# Patient Record
Sex: FEMALE | URBAN - METROPOLITAN AREA
[De-identification: names, ages, dates, MRNs, and addresses within clinical notes are randomized per-mention and may not be internally consistent; named-entity substitution may affect disease eponyms.]

---

## 2022-07-08 ENCOUNTER — APPOINTMENT (RX ONLY)
Dept: URBAN - METROPOLITAN AREA CLINIC 55 | Facility: CLINIC | Age: 37
Setting detail: DERMATOLOGY
End: 2022-07-08

## 2022-07-08 DIAGNOSIS — Z41.9 ENCOUNTER FOR PROCEDURE FOR PURPOSES OTHER THAN REMEDYING HEALTH STATE, UNSPECIFIED: ICD-10-CM

## 2022-07-08 PROCEDURE — ? COSMETIC CONSULTATION: GENERAL

## 2022-07-12 ENCOUNTER — RX ONLY (OUTPATIENT)
Age: 37
Setting detail: RX ONLY
End: 2022-07-12

## 2022-10-31 ENCOUNTER — RX ONLY (OUTPATIENT)
Age: 37
Setting detail: RX ONLY
End: 2022-10-31

## 2022-10-31 RX ORDER — VALACYCLOVIR 500 MG/1
TABLET, FILM COATED ORAL
Qty: 10 | Refills: 0 | Status: ERX | COMMUNITY
Start: 2022-10-31

## 2022-11-01 ENCOUNTER — RX ONLY (OUTPATIENT)
Age: 37
Setting detail: RX ONLY
End: 2022-11-01

## 2022-11-01 RX ORDER — DOXYCYCLINE HYCLATE 100 MG/1
CAPSULE, GELATIN COATED ORAL
Qty: 60 | Refills: 3 | Status: ERX | COMMUNITY
Start: 2022-11-01

## 2022-11-23 ENCOUNTER — APPOINTMENT (RX ONLY)
Dept: URBAN - METROPOLITAN AREA CLINIC 55 | Facility: CLINIC | Age: 37
Setting detail: DERMATOLOGY
End: 2022-11-23

## 2022-11-23 DIAGNOSIS — Z41.9 ENCOUNTER FOR PROCEDURE FOR PURPOSES OTHER THAN REMEDYING HEALTH STATE, UNSPECIFIED: ICD-10-CM

## 2022-11-23 PROCEDURE — ? INVENTORY

## 2022-11-23 PROCEDURE — ? IN-HOUSE DISPENSING PHARMACY

## 2022-11-23 NOTE — PROCEDURE: IN-HOUSE DISPENSING PHARMACY
Product 71 Amount/Unit (Numbers Only): 0
Product 68 Unit Type: mg
Name Of Product 5: Hydrating Tretinoin 0.025% Cream
Name Of Product 2: Dapsone / Spironolactone Gel
Product 4 Application Directions: Apply nightly or as directed.
Product 7 Amount/Unit (Numbers Only): 30
Product 6 Refills: 11
Name Of Product 6: Rosacea Triple Combination Gel
Product 3 Application Directions: Apply nightly or as directed. Use SPF daily.
Send Charges To Patient Encounter: No
Name Of Product 3: Acne Triple Combination Gel
Product 1 Unit Type: ml
Detail Level: Zone
Render Product Pricing In Note: Yes
Product 7 Units Dispensed: 1
Product 1 Refills: 2
Name Of Product 7: Lidocaine 23% / Tetracaine 7% Cream
Name Of Product 4: Hydroquinone 8% Emulsion
Product 7 Application Directions: Apply only as directed
Name Of Product 1: Anti-Aging Brightening Cream

## 2022-12-05 ENCOUNTER — APPOINTMENT (RX ONLY)
Dept: URBAN - METROPOLITAN AREA CLINIC 55 | Facility: CLINIC | Age: 37
Setting detail: DERMATOLOGY
End: 2022-12-05

## 2022-12-05 DIAGNOSIS — Z41.9 ENCOUNTER FOR PROCEDURE FOR PURPOSES OTHER THAN REMEDYING HEALTH STATE, UNSPECIFIED: ICD-10-CM

## 2022-12-05 PROCEDURE — ? FRAXEL

## 2022-12-05 PROCEDURE — ? INVENTORY

## 2022-12-05 NOTE — PROCEDURE: FRAXEL
Location: cheeks
Medium Metal Eye Shield Text: The ocular mucosa was anesthetized with tetracaine. Once adequate anesthesia was optained, medium metal eye shields were inserted and remained in place until the procedure was completed.
Topical Anesthesia Type: 23% lidocaine, 7% tetracaine
Number Of Passes: 1
Number Of Passes: 8
Post-Care Instructions: Ice packs were given to the patient. Reviewed with the patient in detail post-care instructions.\\nRecommended Co2 mask application once a day x3 days and Alastin post procedure kit for post care products.
Length Of Topical Anesthesia Application (Optional): 60 minutes
Treatment Level: 6
Depth In Microns (Use Numbers Only, No Special Characters Or $): 423
Energy(Mj/Cm2): 30
External Cooling Fan Speed: 5
Total Coverage: 11%
Large Metal Eye Shield Text: The ocular mucosa was anesthetized with tetracaine. Once adequate anesthesia was optained, large metal eye shields were inserted and remained in place until the procedure was completed.
Detail Level: Zone
Was An Eye Shield Used?: No
Small Plastic Eye Shield Text: The ocular mucosa was anesthetized with tetracaine. Once adequate anesthesia was optained, small plastic eye shields were inserted and remained in place until the procedure was completed.
Location: full face
Indication: resurfacing
Total Coverage: 14%
Wavelength: 1550nm
Total Energy In Kj (Optional- Don't Include Units): 1.99
Depth In Microns (Use Numbers Only, No Special Characters Or $): 980
Anesthesia Type: 1% lidocaine with epinephrine
Medium Plastic Eye Shield Text: The ocular mucosa was anesthetized with tetracaine. Once adequate anesthesia was optained, medium plastic eye shields were inserted and remained in place until the procedure was completed.
Energy(Mj/Cm2): 15
Location Override: except cheeks
Pre-Procedure Text (Will Appear After Anesthesia Text): Patient reports taking valtrex per protocol and applied HQ cream as directed.\\nThe skin was cleansed and the treatment area was prepped with 70% isopropyl alcohol. \\nLaser goggles placed over the eyes for eye protection.
Total Energy In Kj (Optional- Don't Include Units): 1.98
consent obtained, risks reviewed.
Small Metal Eye Shield Text: The ocular mucosa was anesthetized with tetracaine. Once adequate anesthesia was optained, small metal eye shields were inserted and remained in place until the procedure was completed.
Treatment Level: 4
Large Plastic Eye Shield Text: The ocular mucosa was anesthetized with tetracaine. Once adequate anesthesia was optained, large plastic eye shields were inserted and remained in place until the procedure was completed.

## 2022-12-14 ENCOUNTER — RX ONLY (OUTPATIENT)
Age: 37
Setting detail: RX ONLY
End: 2022-12-14

## 2022-12-14 RX ORDER — FLUCONAZOLE 150 MG/1
TABLET ORAL
Qty: 1 | Refills: 1 | Status: ERX | COMMUNITY
Start: 2022-12-14

## 2023-07-27 ENCOUNTER — APPOINTMENT (RX ONLY)
Dept: URBAN - METROPOLITAN AREA CLINIC 55 | Facility: CLINIC | Age: 38
Setting detail: DERMATOLOGY
End: 2023-07-27

## 2023-07-27 DIAGNOSIS — Z41.9 ENCOUNTER FOR PROCEDURE FOR PURPOSES OTHER THAN REMEDYING HEALTH STATE, UNSPECIFIED: ICD-10-CM

## 2023-07-27 PROCEDURE — ? DERMAPLANE

## 2023-07-27 PROCEDURE — ? INVENTORY

## 2023-07-27 PROCEDURE — ? SIGNATURE HYDRAFACIAL

## 2023-07-27 ASSESSMENT — LOCATION DETAILED DESCRIPTION DERM: LOCATION DETAILED: GLABELLA

## 2023-07-27 ASSESSMENT — LOCATION SIMPLE DESCRIPTION DERM: LOCATION SIMPLE: GLABELLA

## 2023-07-27 ASSESSMENT — LOCATION ZONE DERM: LOCATION ZONE: FACE

## 2023-07-27 NOTE — PROCEDURE: DERMAPLANE
Treatment Areas: face and neck
Blade: 10R blade scalpel
Pre-Procedure Text: The patient was placed in a recumbant position on the procedure table.
Detail Level: Zone
Treatment Area Prep: acetone
Post-Care Instructions: I reviewed with the patient in detail post-care instructions.
Post-Procedure Instructions: Following the dermaplane procedure, phyto corrective mask was applied to the treatment areas. Washed off with cool washcloth. Moisturizer and SPF was applied. Gave patient fraxel brochure and step by step skincare routine.

## 2023-07-27 NOTE — PROCEDURE: SIGNATURE HYDRAFACIAL
Vacuum Pressure Low Setting (Will Not Render If Set To 0): 0
Tip Override
Tip: Hydropeel Tip, Clear
Procedure: Extend and Protect
Solution Override
Solution: Activ-4
Location: face
Procedure: Peel
Procedure: Boost
Solution: Beta-HD
Consent: Written consent obtained, risks reviewed including but not limited to crusting, scabbing, blistering, scarring, darker or lighter pigmentary change, bruising, and/or incomplete response.
Tip: Hydropeel Tip, Teal
Solution: GlySal 7.5%
Post-Care Instructions: I reviewed with the patient in detail post-care instructions. Patient should stay away from the sun and wear sun protection until treated areas are fully healed.
Treatment Number: 1
Indication: prominent pores
Tip: Hydropeel Tip, Blue
Procedure: Fusion
Procedure: Exfoliation
Procedure: Extraction

## 2023-11-15 ENCOUNTER — RX ONLY (OUTPATIENT)
Age: 38
Setting detail: RX ONLY
End: 2023-11-15

## 2023-11-15 ENCOUNTER — APPOINTMENT (RX ONLY)
Dept: URBAN - METROPOLITAN AREA CLINIC 55 | Facility: CLINIC | Age: 38
Setting detail: DERMATOLOGY
End: 2023-11-15

## 2023-11-15 DIAGNOSIS — Z41.9 ENCOUNTER FOR PROCEDURE FOR PURPOSES OTHER THAN REMEDYING HEALTH STATE, UNSPECIFIED: ICD-10-CM

## 2023-11-15 PROCEDURE — ? INVENTORY

## 2023-11-15 PROCEDURE — ? IN-HOUSE DISPENSING PHARMACY

## 2023-11-15 RX ORDER — VALACYCLOVIR 500 MG/1
TABLET, FILM COATED ORAL
Qty: 10 | Refills: 0 | Status: ERX

## 2023-11-15 NOTE — PROCEDURE: IN-HOUSE DISPENSING PHARMACY
Product 46 Refills: 0
Product 16 Unit Type: mg
Detail Level: Zone
Product 4 Amount/Unit (Numbers Only): 30
Product 3 Unit Type: ml
Product 6 Refills: 11
Send Charges To Patient Encounter: No
Product 1 Application Directions: Apply nightly or as directed. Use SPF daily.
Product 7 Units Dispensed: 1
Product 4 Refills: 2
Name Of Product 4: Hydroquinone 8% Emulsion
Product 2 Application Directions: Apply nightly or as directed.
Name Of Product 2: Dapsone / Spironolactone Gel
Name Of Product 1: Anti-Aging Brightening Cream
Name Of Product 7: Lidocaine 23% / Tetracaine 7% Cream
Render Product Pricing In Note: Yes
Product 7 Application Directions: Apply only as directed
Name Of Product 5: Hydrating Tretinoin 0.025% Cream
Name Of Product 3: Acne Triple Combination Gel
Name Of Product 6: Rosacea Triple Combination Gel

## 2023-12-18 ENCOUNTER — RX ONLY (OUTPATIENT)
Age: 38
Setting detail: RX ONLY
End: 2023-12-18

## 2023-12-18 ENCOUNTER — APPOINTMENT (RX ONLY)
Dept: URBAN - METROPOLITAN AREA CLINIC 55 | Facility: CLINIC | Age: 38
Setting detail: DERMATOLOGY
End: 2023-12-18

## 2023-12-18 DIAGNOSIS — Z41.9 ENCOUNTER FOR PROCEDURE FOR PURPOSES OTHER THAN REMEDYING HEALTH STATE, UNSPECIFIED: ICD-10-CM

## 2023-12-18 PROCEDURE — ? INVENTORY

## 2023-12-18 PROCEDURE — ? FRAXEL

## 2023-12-18 RX ORDER — DOXYCYCLINE HYCLATE 100 MG/1
CAPSULE, GELATIN COATED ORAL
Qty: 60 | Refills: 1 | Status: ERX

## 2023-12-18 NOTE — PROCEDURE: FRAXEL
Total Coverage: 14%
Treatment Level: 1
Was An Eye Shield Used?: No
Add Post-Care Below To The Note: Yes
Medium Plastic Eye Shield Text: The ocular mucosa was anesthetized with tetracaine. Once adequate anesthesia was optained, medium plastic eye shields were inserted and remained in place until the procedure was completed.
Energy(Mj/Cm2): 35
Detail Level: Zone
Treatment Level: 7
Post-Care Instructions: Ice packs were given to the patient to cool the skin prior to Co2 lift mask application. \\nReviewed with the patient in detail post-care instructions verbally and written. \\nCo2 lift mask was applied post procedure. \\nRecommended Alastin post procedure kit for post care products.
Small Metal Eye Shield Text: The ocular mucosa was anesthetized with tetracaine. Once adequate anesthesia was optained, small metal eye shields were inserted and remained in place until the procedure was completed.
Wavelength: 1550nm
Large Plastic Eye Shield Text: The ocular mucosa was anesthetized with tetracaine. Once adequate anesthesia was optained, large plastic eye shields were inserted and remained in place until the procedure was completed.
Number Of Passes: 8
Indication: resurfacing
Depth In Microns (Use Numbers Only, No Special Characters Or $): 3844
Tip: 15mm
Medium Metal Eye Shield Text: The ocular mucosa was anesthetized with tetracaine. Once adequate anesthesia was optained, medium metal eye shields were inserted and remained in place until the procedure was completed.
Energy(Mj/Cm2): 15
Total Energy In Kj (Optional- Don't Include Units): 4.70
Large Metal Eye Shield Text: The ocular mucosa was anesthetized with tetracaine. Once adequate anesthesia was optained, large metal eye shields were inserted and remained in place until the procedure was completed.
Treatment Level: 5
External Cooling: Lucian Cryo 5
Location: Use Location Override
Topical Anesthesia Type: 23% lidocaine, 7% tetracaine
Location: cheeks
Pre-Procedure Text (Will Appear After Anesthesia Text): Patient reports application of hydroquinone 8% cream as directed.\\nThe skin was cleansed with hibiclens and prepped with 70% isopropyl alcohol. \\nLaser goggles placed over the eyes for eye protection.
Depth In Microns (Use Numbers Only, No Special Characters Or $): 080
Length Of Topical Anesthesia Application (Optional): 60 minutes
Small Plastic Eye Shield Text: The ocular mucosa was anesthetized with tetracaine. Once adequate anesthesia was optained, small plastic eye shields were inserted and remained in place until the procedure was completed.
consent obtained, risks reviewed.
Location Override: forehead, perioral and nose
Total Coverage: 20%

## 2024-09-24 ENCOUNTER — RX ONLY (OUTPATIENT)
Age: 39
Setting detail: RX ONLY
End: 2024-09-24

## 2024-09-24 ENCOUNTER — APPOINTMENT (RX ONLY)
Dept: URBAN - METROPOLITAN AREA CLINIC 55 | Facility: CLINIC | Age: 39
Setting detail: DERMATOLOGY
End: 2024-09-24

## 2024-09-24 DIAGNOSIS — Z41.9 ENCOUNTER FOR PROCEDURE FOR PURPOSES OTHER THAN REMEDYING HEALTH STATE, UNSPECIFIED: ICD-10-CM

## 2024-09-24 PROCEDURE — ? IN-HOUSE DISPENSING PHARMACY

## 2024-09-24 PROCEDURE — ? COSMETIC CONSULTATION: GENERAL

## 2024-09-24 PROCEDURE — ? INVENTORY

## 2024-09-24 PROCEDURE — ? COSMETIC CONSULTATION: LASER RESURFACING

## 2024-09-24 RX ORDER — DOXYCYCLINE HYCLATE 100 MG/1
CAPSULE, GELATIN COATED ORAL
Qty: 60 | Refills: 3 | Status: ERX | COMMUNITY
Start: 2024-09-24

## 2024-09-24 RX ORDER — VALACYCLOVIR 500 MG/1
TABLET, FILM COATED ORAL
Qty: 10 | Refills: 0 | Status: ERX | COMMUNITY
Start: 2024-09-24

## 2024-09-24 NOTE — PROCEDURE: IN-HOUSE DISPENSING PHARMACY
Product 70 Price/Unit (In Dollars): 0
Product 34 Unit Type: mg
Product 2 Refills: 11
Product 1 Refills: 2
Product 4 Amount/Unit (Numbers Only): 30
Product 5 Application Directions: Apply nightly or as directed. Use SPF daily.
Product 2 Unit Type: ml
Render Product Pricing In Note: Yes
Name Of Product 7: Lidocaine 23% / Tetracaine 7% Cream
Name Of Product 4: Hydroquinone 8% Emulsion
Send Charges To Patient Encounter: No
Detail Level: Zone
Name Of Product 2: Dapsone / Spironolactone Gel
Name Of Product 1: Anti-Aging Brightening Cream
Product 7 Application Directions: Apply only as directed
Name Of Product 3: Acne Triple Combination Gel
Name Of Product 5: Hydrating Tretinoin 0.025% Cream
Product 4 Application Directions: Apply nightly or as directed.
Product 4 Units Dispensed: 1
Name Of Product 6: Rosacea Triple Combination Gel

## 2024-12-02 ENCOUNTER — APPOINTMENT (RX ONLY)
Dept: URBAN - METROPOLITAN AREA CLINIC 55 | Facility: CLINIC | Age: 39
Setting detail: DERMATOLOGY
End: 2024-12-02

## 2024-12-02 ENCOUNTER — RX ONLY (OUTPATIENT)
Age: 39
Setting detail: RX ONLY
End: 2024-12-02

## 2024-12-02 DIAGNOSIS — Z41.9 ENCOUNTER FOR PROCEDURE FOR PURPOSES OTHER THAN REMEDYING HEALTH STATE, UNSPECIFIED: ICD-10-CM

## 2024-12-02 PROCEDURE — ? INVENTORY

## 2024-12-02 RX ORDER — HYDROQUINONE 40 MG/G
CREAM TOPICAL
Qty: 28.35 | Refills: 1 | Status: ERX | COMMUNITY
Start: 2024-12-02

## 2025-05-19 ENCOUNTER — APPOINTMENT (OUTPATIENT)
Dept: URBAN - METROPOLITAN AREA CLINIC 55 | Facility: CLINIC | Age: 40
Setting detail: DERMATOLOGY
End: 2025-05-19

## 2025-05-19 DIAGNOSIS — Z41.9 ENCOUNTER FOR PROCEDURE FOR PURPOSES OTHER THAN REMEDYING HEALTH STATE, UNSPECIFIED: ICD-10-CM

## 2025-05-19 PROCEDURE — ? COSMETIC CONSULTATION: MICRONEEDLING

## 2025-05-19 PROCEDURE — ? INVENTORY

## 2025-08-08 ENCOUNTER — RX ONLY (RX ONLY)
Age: 40
End: 2025-08-08

## 2025-08-08 ENCOUNTER — APPOINTMENT (OUTPATIENT)
Dept: URBAN - METROPOLITAN AREA CLINIC 55 | Facility: CLINIC | Age: 40
Setting detail: DERMATOLOGY
End: 2025-08-08

## 2025-08-18 ENCOUNTER — APPOINTMENT (OUTPATIENT)
Dept: URBAN - METROPOLITAN AREA CLINIC 55 | Facility: CLINIC | Age: 40
Setting detail: DERMATOLOGY
End: 2025-08-18

## 2025-08-18 DIAGNOSIS — Z41.9 ENCOUNTER FOR PROCEDURE FOR PURPOSES OTHER THAN REMEDYING HEALTH STATE, UNSPECIFIED: ICD-10-CM

## 2025-08-18 PROCEDURE — ? MICRONEEDLING

## 2025-08-18 PROCEDURE — ? INVENTORY
